# Patient Record
Sex: FEMALE | Race: NATIVE HAWAIIAN OR OTHER PACIFIC ISLANDER | NOT HISPANIC OR LATINO | Employment: OTHER | URBAN - METROPOLITAN AREA
[De-identification: names, ages, dates, MRNs, and addresses within clinical notes are randomized per-mention and may not be internally consistent; named-entity substitution may affect disease eponyms.]

---

## 2018-08-22 ENCOUNTER — APPOINTMENT (OUTPATIENT)
Dept: RADIOLOGY | Facility: MEDICAL CENTER | Age: 73
End: 2018-08-22
Attending: EMERGENCY MEDICINE
Payer: MEDICARE

## 2018-08-22 ENCOUNTER — HOSPITAL ENCOUNTER (OUTPATIENT)
Facility: MEDICAL CENTER | Age: 73
End: 2018-08-23
Attending: EMERGENCY MEDICINE | Admitting: INTERNAL MEDICINE
Payer: MEDICARE

## 2018-08-22 DIAGNOSIS — I10 HYPERTENSION, UNSPECIFIED TYPE: ICD-10-CM

## 2018-08-22 DIAGNOSIS — N39.0 ACUTE UTI: ICD-10-CM

## 2018-08-22 PROBLEM — R31.9 URINARY TRACT INFECTION WITH HEMATURIA: Status: ACTIVE | Noted: 2018-08-22

## 2018-08-22 PROBLEM — R79.89 ELEVATED BRAIN NATRIURETIC PEPTIDE (BNP) LEVEL: Status: ACTIVE | Noted: 2018-08-22

## 2018-08-22 PROBLEM — D64.9 NORMOCYTIC ANEMIA: Status: ACTIVE | Noted: 2018-08-22

## 2018-08-22 PROBLEM — E11.9 DMII (DIABETES MELLITUS, TYPE 2) (HCC): Status: ACTIVE | Noted: 2018-08-22

## 2018-08-22 PROBLEM — D72.829 LEUCOCYTOSIS: Status: ACTIVE | Noted: 2018-08-22

## 2018-08-22 LAB
ALBUMIN SERPL BCP-MCNC: 4.1 G/DL (ref 3.2–4.9)
ALBUMIN/GLOB SERPL: 1.6 G/DL
ALP SERPL-CCNC: 67 U/L (ref 30–99)
ALT SERPL-CCNC: 11 U/L (ref 2–50)
ANION GAP SERPL CALC-SCNC: 12 MMOL/L (ref 0–11.9)
ANISOCYTOSIS BLD QL SMEAR: ABNORMAL
APPEARANCE UR: ABNORMAL
AST SERPL-CCNC: 17 U/L (ref 12–45)
BACTERIA #/AREA URNS HPF: ABNORMAL /HPF
BASOPHILS # BLD AUTO: 6.1 % (ref 0–1.8)
BASOPHILS # BLD: 1.28 K/UL (ref 0–0.12)
BILIRUB SERPL-MCNC: 1.9 MG/DL (ref 0.1–1.5)
BILIRUB UR QL STRIP.AUTO: NEGATIVE
BNP SERPL-MCNC: 510 PG/ML (ref 0–100)
BUN SERPL-MCNC: 16 MG/DL (ref 8–22)
CALCIUM SERPL-MCNC: 9.2 MG/DL (ref 8.5–10.5)
CHLORIDE SERPL-SCNC: 106 MMOL/L (ref 96–112)
CO2 SERPL-SCNC: 22 MMOL/L (ref 20–33)
COLOR UR: YELLOW
CREAT SERPL-MCNC: 0.81 MG/DL (ref 0.5–1.4)
DACRYOCYTES BLD QL SMEAR: NORMAL
EKG IMPRESSION: NORMAL
EOSINOPHIL # BLD AUTO: 0.55 K/UL (ref 0–0.51)
EOSINOPHIL NFR BLD: 2.6 % (ref 0–6.9)
EPI CELLS #/AREA URNS HPF: ABNORMAL /HPF
ERYTHROCYTE [DISTWIDTH] IN BLOOD BY AUTOMATED COUNT: 57.1 FL (ref 35.9–50)
EST. AVERAGE GLUCOSE BLD GHB EST-MCNC: 146 MG/DL
FERRITIN SERPL-MCNC: 69.7 NG/ML (ref 10–291)
FOLATE SERPL-MCNC: 11.1 NG/ML
GLOBULIN SER CALC-MCNC: 2.6 G/DL (ref 1.9–3.5)
GLUCOSE SERPL-MCNC: 153 MG/DL (ref 65–99)
GLUCOSE UR STRIP.AUTO-MCNC: NEGATIVE MG/DL
HBA1C MFR BLD: 6.7 % (ref 0–5.6)
HCT VFR BLD AUTO: 33.9 % (ref 37–47)
HGB BLD-MCNC: 10.8 G/DL (ref 12–16)
HYALINE CASTS #/AREA URNS LPF: ABNORMAL /LPF
IRON SATN MFR SERPL: 5 % (ref 15–55)
IRON SERPL-MCNC: 18 UG/DL (ref 40–170)
KETONES UR STRIP.AUTO-MCNC: NEGATIVE MG/DL
LEUKOCYTE ESTERASE UR QL STRIP.AUTO: ABNORMAL
LG PLATELETS BLD QL SMEAR: NORMAL
LV EJECT FRACT  99904: 40
LV EJECT FRACT MOD 2C 99903: 42.2
LV EJECT FRACT MOD 4C 99902: 51.26
LV EJECT FRACT MOD BP 99901: 48.83
LYMPHOCYTES # BLD AUTO: 1.28 K/UL (ref 1–4.8)
LYMPHOCYTES NFR BLD: 6.1 % (ref 22–41)
MANUAL DIFF BLD: NORMAL
MCH RBC QN AUTO: 28.1 PG (ref 27–33)
MCHC RBC AUTO-ENTMCNC: 31.9 G/DL (ref 33.6–35)
MCV RBC AUTO: 88.1 FL (ref 81.4–97.8)
METAMYELOCYTES NFR BLD MANUAL: 2.6 %
MICRO URNS: ABNORMAL
MICROCYTES BLD QL SMEAR: ABNORMAL
MONOCYTES # BLD AUTO: 0.74 K/UL (ref 0–0.85)
MONOCYTES NFR BLD AUTO: 3.5 % (ref 0–13.4)
MORPHOLOGY BLD-IMP: NORMAL
MYELOCYTES NFR BLD MANUAL: 1.7 %
NEUTROPHILS # BLD AUTO: 16.25 K/UL (ref 2–7.15)
NEUTROPHILS NFR BLD: 74.8 % (ref 44–72)
NEUTS BAND NFR BLD MANUAL: 2.6 % (ref 0–10)
NITRITE UR QL STRIP.AUTO: POSITIVE
NRBC # BLD AUTO: 0.32 K/UL
NRBC BLD-RTO: 1.5 /100 WBC
OVALOCYTES BLD QL SMEAR: NORMAL
PH UR STRIP.AUTO: 5 [PH]
PLATELET # BLD AUTO: 449 K/UL (ref 164–446)
PLATELET BLD QL SMEAR: NORMAL
PMV BLD AUTO: 11.6 FL (ref 9–12.9)
POIKILOCYTOSIS BLD QL SMEAR: NORMAL
POLYCHROMASIA BLD QL SMEAR: NORMAL
POTASSIUM SERPL-SCNC: 4.2 MMOL/L (ref 3.6–5.5)
PROT SERPL-MCNC: 6.7 G/DL (ref 6–8.2)
PROT UR QL STRIP: 30 MG/DL
RBC # BLD AUTO: 3.85 M/UL (ref 4.2–5.4)
RBC # URNS HPF: ABNORMAL /HPF
RBC BLD AUTO: PRESENT
RBC UR QL AUTO: ABNORMAL
SCHISTOCYTES BLD QL SMEAR: NORMAL
SODIUM SERPL-SCNC: 140 MMOL/L (ref 135–145)
SP GR UR STRIP.AUTO: 1.02
TIBC SERPL-MCNC: 339 UG/DL (ref 250–450)
TROPONIN I SERPL-MCNC: 0.02 NG/ML (ref 0–0.04)
TSH SERPL DL<=0.005 MIU/L-ACNC: 1.38 UIU/ML (ref 0.38–5.33)
UROBILINOGEN UR STRIP.AUTO-MCNC: 0.2 MG/DL
VIT B12 SERPL-MCNC: 862 PG/ML (ref 211–911)
WBC # BLD AUTO: 21 K/UL (ref 4.8–10.8)
WBC #/AREA URNS HPF: ABNORMAL /HPF

## 2018-08-22 PROCEDURE — 83540 ASSAY OF IRON: CPT

## 2018-08-22 PROCEDURE — 99285 EMERGENCY DEPT VISIT HI MDM: CPT

## 2018-08-22 PROCEDURE — 85027 COMPLETE CBC AUTOMATED: CPT

## 2018-08-22 PROCEDURE — 700111 HCHG RX REV CODE 636 W/ 250 OVERRIDE (IP): Performed by: EMERGENCY MEDICINE

## 2018-08-22 PROCEDURE — 82607 VITAMIN B-12: CPT

## 2018-08-22 PROCEDURE — G0378 HOSPITAL OBSERVATION PER HR: HCPCS

## 2018-08-22 PROCEDURE — 82746 ASSAY OF FOLIC ACID SERUM: CPT

## 2018-08-22 PROCEDURE — 96372 THER/PROPH/DIAG INJ SC/IM: CPT | Mod: XU

## 2018-08-22 PROCEDURE — 93005 ELECTROCARDIOGRAM TRACING: CPT | Performed by: EMERGENCY MEDICINE

## 2018-08-22 PROCEDURE — A9270 NON-COVERED ITEM OR SERVICE: HCPCS | Performed by: INTERNAL MEDICINE

## 2018-08-22 PROCEDURE — 71045 X-RAY EXAM CHEST 1 VIEW: CPT

## 2018-08-22 PROCEDURE — 80053 COMPREHEN METABOLIC PANEL: CPT

## 2018-08-22 PROCEDURE — 93005 ELECTROCARDIOGRAM TRACING: CPT

## 2018-08-22 PROCEDURE — 81001 URINALYSIS AUTO W/SCOPE: CPT

## 2018-08-22 PROCEDURE — 83036 HEMOGLOBIN GLYCOSYLATED A1C: CPT

## 2018-08-22 PROCEDURE — 70450 CT HEAD/BRAIN W/O DYE: CPT

## 2018-08-22 PROCEDURE — 82728 ASSAY OF FERRITIN: CPT

## 2018-08-22 PROCEDURE — 96375 TX/PRO/DX INJ NEW DRUG ADDON: CPT | Mod: XU

## 2018-08-22 PROCEDURE — 87077 CULTURE AEROBIC IDENTIFY: CPT

## 2018-08-22 PROCEDURE — 87186 SC STD MICRODIL/AGAR DIL: CPT

## 2018-08-22 PROCEDURE — 85007 BL SMEAR W/DIFF WBC COUNT: CPT

## 2018-08-22 PROCEDURE — 84484 ASSAY OF TROPONIN QUANT: CPT

## 2018-08-22 PROCEDURE — 83550 IRON BINDING TEST: CPT

## 2018-08-22 PROCEDURE — 700102 HCHG RX REV CODE 250 W/ 637 OVERRIDE(OP): Performed by: INTERNAL MEDICINE

## 2018-08-22 PROCEDURE — 96365 THER/PROPH/DIAG IV INF INIT: CPT | Mod: XU

## 2018-08-22 PROCEDURE — 700105 HCHG RX REV CODE 258: Performed by: INTERNAL MEDICINE

## 2018-08-22 PROCEDURE — 93306 TTE W/DOPPLER COMPLETE: CPT | Mod: 26 | Performed by: INTERNAL MEDICINE

## 2018-08-22 PROCEDURE — 700111 HCHG RX REV CODE 636 W/ 250 OVERRIDE (IP): Performed by: INTERNAL MEDICINE

## 2018-08-22 PROCEDURE — 87086 URINE CULTURE/COLONY COUNT: CPT

## 2018-08-22 PROCEDURE — 700105 HCHG RX REV CODE 258: Performed by: EMERGENCY MEDICINE

## 2018-08-22 PROCEDURE — 99220 PR INITIAL OBSERVATION CARE,LEVL III: CPT | Performed by: INTERNAL MEDICINE

## 2018-08-22 PROCEDURE — 83880 ASSAY OF NATRIURETIC PEPTIDE: CPT

## 2018-08-22 PROCEDURE — 84443 ASSAY THYROID STIM HORMONE: CPT

## 2018-08-22 PROCEDURE — 93306 TTE W/DOPPLER COMPLETE: CPT

## 2018-08-22 RX ORDER — SODIUM CHLORIDE 9 MG/ML
INJECTION, SOLUTION INTRAVENOUS CONTINUOUS
Status: DISCONTINUED | OUTPATIENT
Start: 2018-08-22 | End: 2018-08-23

## 2018-08-22 RX ORDER — ATORVASTATIN CALCIUM 10 MG/1
10 TABLET, FILM COATED ORAL NIGHTLY
COMMUNITY

## 2018-08-22 RX ORDER — CARVEDILOL 12.5 MG/1
12.5 TABLET ORAL 2 TIMES DAILY WITH MEALS
COMMUNITY

## 2018-08-22 RX ORDER — ONDANSETRON 2 MG/ML
4 INJECTION INTRAMUSCULAR; INTRAVENOUS EVERY 4 HOURS PRN
Status: DISCONTINUED | OUTPATIENT
Start: 2018-08-22 | End: 2018-08-23 | Stop reason: HOSPADM

## 2018-08-22 RX ORDER — PRAVASTATIN SODIUM 20 MG
80 TABLET ORAL NIGHTLY
Status: DISCONTINUED | OUTPATIENT
Start: 2018-08-22 | End: 2018-08-22

## 2018-08-22 RX ORDER — ACETAMINOPHEN 325 MG/1
650 TABLET ORAL EVERY 6 HOURS PRN
Status: DISCONTINUED | OUTPATIENT
Start: 2018-08-22 | End: 2018-08-23 | Stop reason: HOSPADM

## 2018-08-22 RX ORDER — POLYETHYLENE GLYCOL 3350 17 G/17G
1 POWDER, FOR SOLUTION ORAL
Status: DISCONTINUED | OUTPATIENT
Start: 2018-08-22 | End: 2018-08-23 | Stop reason: HOSPADM

## 2018-08-22 RX ORDER — ONDANSETRON 4 MG/1
4 TABLET, ORALLY DISINTEGRATING ORAL EVERY 4 HOURS PRN
Status: DISCONTINUED | OUTPATIENT
Start: 2018-08-22 | End: 2018-08-23 | Stop reason: HOSPADM

## 2018-08-22 RX ORDER — AMLODIPINE BESYLATE 10 MG/1
10 TABLET ORAL
Status: DISCONTINUED | OUTPATIENT
Start: 2018-08-23 | End: 2018-08-23 | Stop reason: HOSPADM

## 2018-08-22 RX ORDER — AMOXICILLIN 250 MG
2 CAPSULE ORAL 2 TIMES DAILY
Status: DISCONTINUED | OUTPATIENT
Start: 2018-08-22 | End: 2018-08-23 | Stop reason: HOSPADM

## 2018-08-22 RX ORDER — ENALAPRILAT 1.25 MG/ML
1.25 INJECTION INTRAVENOUS EVERY 6 HOURS PRN
Status: DISCONTINUED | OUTPATIENT
Start: 2018-08-22 | End: 2018-08-23 | Stop reason: HOSPADM

## 2018-08-22 RX ORDER — ATORVASTATIN CALCIUM 10 MG/1
10 TABLET, FILM COATED ORAL EVERY EVENING
Status: DISCONTINUED | OUTPATIENT
Start: 2018-08-22 | End: 2018-08-23 | Stop reason: HOSPADM

## 2018-08-22 RX ORDER — OLMESARTAN MEDOXOMIL 20 MG/1
20 TABLET ORAL DAILY
Status: DISCONTINUED | OUTPATIENT
Start: 2018-08-22 | End: 2018-08-23 | Stop reason: HOSPADM

## 2018-08-22 RX ORDER — CARVEDILOL 6.25 MG/1
6.25 TABLET ORAL 2 TIMES DAILY WITH MEALS
Status: DISCONTINUED | OUTPATIENT
Start: 2018-08-22 | End: 2018-08-23 | Stop reason: HOSPADM

## 2018-08-22 RX ORDER — ALLOPURINOL 100 MG/1
100 TABLET ORAL EVERY MORNING
COMMUNITY

## 2018-08-22 RX ORDER — ATENOLOL 50 MG/1
50 TABLET ORAL DAILY
COMMUNITY
End: 2018-08-22

## 2018-08-22 RX ORDER — BISACODYL 10 MG
10 SUPPOSITORY, RECTAL RECTAL
Status: DISCONTINUED | OUTPATIENT
Start: 2018-08-22 | End: 2018-08-23 | Stop reason: HOSPADM

## 2018-08-22 RX ORDER — TRAZODONE HYDROCHLORIDE 50 MG/1
50 TABLET ORAL
Status: DISCONTINUED | OUTPATIENT
Start: 2018-08-22 | End: 2018-08-23 | Stop reason: HOSPADM

## 2018-08-22 RX ORDER — ATENOLOL 50 MG/1
50 TABLET ORAL DAILY
Status: DISCONTINUED | OUTPATIENT
Start: 2018-08-22 | End: 2018-08-22

## 2018-08-22 RX ADMIN — CEFTRIAXONE SODIUM 2 G: 2 INJECTION, POWDER, FOR SOLUTION INTRAMUSCULAR; INTRAVENOUS at 13:23

## 2018-08-22 RX ADMIN — ONDANSETRON 4 MG: 2 INJECTION, SOLUTION INTRAMUSCULAR; INTRAVENOUS at 19:53

## 2018-08-22 RX ADMIN — ATORVASTATIN CALCIUM 10 MG: 10 TABLET, FILM COATED ORAL at 18:15

## 2018-08-22 RX ADMIN — SODIUM CHLORIDE: 9 INJECTION, SOLUTION INTRAVENOUS at 16:09

## 2018-08-22 RX ADMIN — ENOXAPARIN SODIUM 40 MG: 40 INJECTION, SOLUTION INTRAVENOUS; SUBCUTANEOUS at 16:08

## 2018-08-22 RX ADMIN — CARVEDILOL 6.25 MG: 6.25 TABLET, FILM COATED ORAL at 18:16

## 2018-08-22 RX ADMIN — TRAZODONE HYDROCHLORIDE 50 MG: 50 TABLET ORAL at 20:40

## 2018-08-22 ASSESSMENT — ENCOUNTER SYMPTOMS
PALPITATIONS: 0
SPUTUM PRODUCTION: 0
NERVOUS/ANXIOUS: 0
BACK PAIN: 0
COUGH: 0
ORTHOPNEA: 0
WEIGHT LOSS: 0
DEPRESSION: 0
HEADACHES: 1
MYALGIAS: 0
DIAPHORESIS: 1
FEVER: 0
ABDOMINAL PAIN: 0
NAUSEA: 0
SEIZURES: 0
INSOMNIA: 0
EYE DISCHARGE: 0
EYE PAIN: 0
HEARTBURN: 0
NECK PAIN: 0
DIZZINESS: 0
BLURRED VISION: 0
FOCAL WEAKNESS: 0
SHORTNESS OF BREATH: 0
DIARRHEA: 0
CHILLS: 0
STRIDOR: 0
VOMITING: 0
EYE REDNESS: 0

## 2018-08-22 ASSESSMENT — LIFESTYLE VARIABLES
EVER_SMOKED: YES
ALCOHOL_USE: NO

## 2018-08-22 ASSESSMENT — PAIN SCALES - GENERAL
PAINLEVEL_OUTOF10: 0

## 2018-08-22 ASSESSMENT — COPD QUESTIONNAIRES
DO YOU EVER COUGH UP ANY MUCUS OR PHLEGM?: NO/ONLY WITH OCCASIONAL COLDS OR INFECTIONS
HAVE YOU SMOKED AT LEAST 100 CIGARETTES IN YOUR ENTIRE LIFE: YES
COPD SCREENING SCORE: 4
DURING THE PAST 4 WEEKS HOW MUCH DID YOU FEEL SHORT OF BREATH: NONE/LITTLE OF THE TIME
IN THE PAST 12 MONTHS DO YOU DO LESS THAN YOU USED TO BECAUSE OF YOUR BREATHING PROBLEMS: DISAGREE/UNSURE

## 2018-08-22 ASSESSMENT — PATIENT HEALTH QUESTIONNAIRE - PHQ9
SUM OF ALL RESPONSES TO PHQ9 QUESTIONS 1 AND 2: 0
2. FEELING DOWN, DEPRESSED, IRRITABLE, OR HOPELESS: NOT AT ALL
1. LITTLE INTEREST OR PLEASURE IN DOING THINGS: NOT AT ALL

## 2018-08-22 NOTE — ED PROVIDER NOTES
"ED Provider Note    CHIEF COMPLAINT  Chief Complaint   Patient presents with   • Hypertension     Pt reports \"my blood pressure has been all over the place.\"   • Headache     back of head, pt states \"I have a fuzzy feeling.\"   • Sweats     3-4 days       HPI  Tamanna Stockton is a 72 y.o. female here for evaluation of hypertension and headache.  The patient states that she has had a \"back of the head\" headache over the last 3 or 4 days, this is been intermittent, and is associated with her elevated blood pressure.  She does have a home monitoring system, and states that her blood pressure over the weekend was 200 systolically.  She denies any chest pain, shortness of breath, or weakness.  She has no abdominal pain or back pain.  She has no leg pain or paresthesias.  She states that over the course of the week and she has had 2 episodes of intermittent diaphoresis, accompanied with nausea.  She has no other medical concerns at this time.    PAST MEDICAL HISTORY   has a past medical history of Diabetes (HCC); Hypercholesteremia; and Hypertension.    SOCIAL HISTORY  Social History     Social History Main Topics   • Smoking status: Former Smoker   • Smokeless tobacco: Never Used   • Alcohol use No   • Drug use: No   • Sexual activity: Not on file       Family History  No bleeding disorders    SURGICAL HISTORY  patient denies any surgical history    CURRENT MEDICATIONS  Home Medications     Reviewed by Marybel Addison R.N. (Registered Nurse) on 08/22/18 at 0956  Med List Status: Partial   Medication Last Dose Status   atenolol (TENORMIN) 50 MG Tab  Active   olmesartan (BENICAR) 20 MG TABS not taking Active   pravastatin (PRAVACHOL) 80 MG tablet  Active                ALLERGIES  Allergies   Allergen Reactions   • Naproxen Hives and Rash     \"I break out\"       REVIEW OF SYSTEMS  See HPI for further details. Review of systems as above, otherwise all other systems are negative.     PHYSICAL EXAM  Constitutional: Well " developed, well nourished. No acute distress.  HEENT: Normocephalic, atraumatic. Posterior pharynx clear and moist.  Eyes:  EOMI. Normal sclera.  Neck: Supple, Full range of motion, nontender.  Chest/Pulmonary: clear to ausculation. Symmetrical expansion.   Cardio: Regular rate and rhythm with no murmur.   Abdomen: Soft, nontender. No peritoneal signs. No guarding. No palpable masses.  Back:  No cvat.  Non tender midline.   Musculoskeletal: No deformity, no edema, neurovascular intact.   Neuro: Clear speech, appropriate, cooperative, cranial nerves II-XII grossly intact.  Psych: Normal mood and affect    PROCEDURES     MEDICAL RECORD  I have reviewed patient's medical record and pertinent results are listed above.    COURSE & MEDICAL DECISION MAKING  I have reviewed any medical record information, laboratory studies and radiographic results as noted above.    Results for orders placed or performed during the hospital encounter of 08/22/18   CBC w/ Differential   Result Value Ref Range    WBC 21.0 (H) 4.8 - 10.8 K/uL    RBC 3.85 (L) 4.20 - 5.40 M/uL    Hemoglobin 10.8 (L) 12.0 - 16.0 g/dL    Hematocrit 33.9 (L) 37.0 - 47.0 %    MCV 88.1 81.4 - 97.8 fL    MCH 28.1 27.0 - 33.0 pg    MCHC 31.9 (L) 33.6 - 35.0 g/dL    RDW 57.1 (H) 35.9 - 50.0 fL    Platelet Count 449 (H) 164 - 446 K/uL    MPV 11.6 9.0 - 12.9 fL    Nucleated RBC 1.50 /100 WBC    NRBC (Absolute) 0.32 K/uL    Neutrophils-Polys 74.80 (H) 44.00 - 72.00 %    Lymphocytes 6.10 (L) 22.00 - 41.00 %    Monocytes 3.50 0.00 - 13.40 %    Eosinophils 2.60 0.00 - 6.90 %    Basophils 6.10 (H) 0.00 - 1.80 %    Neutrophils (Absolute) 16.25 (H) 2.00 - 7.15 K/uL    Lymphs (Absolute) 1.28 1.00 - 4.80 K/uL    Monos (Absolute) 0.74 0.00 - 0.85 K/uL    Eos (Absolute) 0.55 (H) 0.00 - 0.51 K/uL    Baso (Absolute) 1.28 (H) 0.00 - 0.12 K/uL    Anisocytosis 2+     Microcytosis 2+    Complete Metabolic Panel (CMP)   Result Value Ref Range    Sodium 140 135 - 145 mmol/L    Potassium  4.2 3.6 - 5.5 mmol/L    Chloride 106 96 - 112 mmol/L    Co2 22 20 - 33 mmol/L    Anion Gap 12.0 (H) 0.0 - 11.9    Glucose 153 (H) 65 - 99 mg/dL    Bun 16 8 - 22 mg/dL    Creatinine 0.81 0.50 - 1.40 mg/dL    Calcium 9.2 8.5 - 10.5 mg/dL    AST(SGOT) 17 12 - 45 U/L    ALT(SGPT) 11 2 - 50 U/L    Alkaline Phosphatase 67 30 - 99 U/L    Total Bilirubin 1.9 (H) 0.1 - 1.5 mg/dL    Albumin 4.1 3.2 - 4.9 g/dL    Total Protein 6.7 6.0 - 8.2 g/dL    Globulin 2.6 1.9 - 3.5 g/dL    A-G Ratio 1.6 g/dL   Btype Natriuretic Peptide   Result Value Ref Range    B Natriuretic Peptide 510 (H) 0 - 100 pg/mL   Troponin STAT   Result Value Ref Range    Troponin I 0.02 0.00 - 0.04 ng/mL   URINALYSIS,CULTURE IF INDICATED   Result Value Ref Range    Color Yellow     Character Cloudy (A)     Specific Gravity 1.019 <1.035    Ph 5.0 5.0 - 8.0    Glucose Negative Negative mg/dL    Ketones Negative Negative mg/dL    Protein 30 (A) Negative mg/dL    Bilirubin Negative Negative    Urobilinogen, Urine 0.2 Negative    Nitrite Positive (A) Negative    Leukocyte Esterase Large (A) Negative    Occult Blood Small (A) Negative    Micro Urine Req Microscopic    ESTIMATED GFR   Result Value Ref Range    GFR If African American >60 >60 mL/min/1.73 m 2    GFR If Non African American >60 >60 mL/min/1.73 m 2   DIFFERENTIAL MANUAL   Result Value Ref Range    Bands-Stabs 2.60 0.00 - 10.00 %    Metamyelocytes 2.60 %    Myelocytes 1.70 %    Manual Diff Status PERFORMED    PERIPHERAL SMEAR REVIEW   Result Value Ref Range    Peripheral Smear Review see below    PLATELET ESTIMATE   Result Value Ref Range    Plt Estimation Normal    MORPHOLOGY   Result Value Ref Range    RBC Morphology Present     Large Platelets 3+     Polychromia 1+     Poikilocytosis 2+     Ovalocytes 1+     Schistocytes 1+     Tear Drop Cells 1+    URINE MICROSCOPIC (W/UA)   Result Value Ref Range    -150 (A) /hpf    RBC 2-5 (A) /hpf    Bacteria Many (A) None /hpf    Epithelial Cells Few /hpf     Hyaline Cast 11-20 (A) /lpf   EKG (NOW)   Result Value Ref Range    Report       [facility]    Test Date:  2018  Pt Name:    GEE SORENSEN               Department: ER  MRN:        [id number]                      Room:  Gender:     Female                       Technician: [id number]  :        [id number]                   Requested By:ER TRIAGE PROTOCOL  Order #:    [id number]                    Reading MD:    Measurements  Intervals                                Axis  Rate:       84                           P:          57  IN:         188                          QRS:        24  QRSD:       88                           T:          112  QT:         416  QTc:        492    Interpretive Statements  SINUS RHYTHM  NONSPECIFIC T ABNORMALITIES, LATERAL LEADS  BORDERLINE PROLONGED QT INTERVAL  No previous ECG available for comparison       CT-HEAD W/O   Final Result         1. No acute intracranial abnormality. No evidence of acute intracranial hemorrhage or mass lesion.               DX-CHEST-PORTABLE (1 VIEW)   Final Result         1. No acute cardiopulmonary abnormalities are identified.        The pt will be admitted to the CDU under Dr. Iniguez.  The patient currently has a blood pressure of 152/76, and does not have a headache.  We will do antibiotics down here in the emergency department, and she will be admitted.  She is afebrile and nontoxic-appearing, however because of her white count of 20,000 and her urine, she will need IV antibiotics and placement.    Differential diagnoses include but not limited to: hypertensive headache,  Mi, pneumonia, uti    FINAL IMPRESSION  1. Acute UTI    2. Hypertension, unspecified type    Electronically signed by: Wesly Quijano, 2018 11:07 AM

## 2018-08-22 NOTE — H&P
" Hospital Medicine History and Physical      Date of Service  8/22/2018    Chief Complaint  Chief Complaint   Patient presents with   • Hypertension     Pt reports \"my blood pressure has been all over the place.\"   • Headache     back of head, pt states \"I have a fuzzy feeling.\"   • Sweats     3-4 days       History of Presenting Illness  Mahin is a 72 y.o. female PMH of HTN, DM, DLD, who presents with above. She said since Sunday she has been having very high blood pressure with systolic blood pressure up to 230.  She was also complaining about headache associated with it.  And she has not been able to sleep well.  The patient denied any neurological deficit.  In the ER she was found to have a urinary tract infection.  She will be admitted for observation.    Primary Care Physician  Pcp Pt States None      Code Status  Full code    Review of Systems  Review of Systems   Constitutional: Positive for diaphoresis. Negative for chills, fever and weight loss.   HENT: Negative for congestion and nosebleeds.    Eyes: Negative for blurred vision, pain, discharge and redness.   Respiratory: Negative for cough, sputum production, shortness of breath and stridor.    Cardiovascular: Negative for chest pain, palpitations and orthopnea.   Gastrointestinal: Negative for abdominal pain, diarrhea, heartburn, nausea and vomiting.   Genitourinary: Negative for dysuria, frequency and urgency.   Musculoskeletal: Negative for back pain, myalgias and neck pain.   Skin: Negative for itching and rash.   Neurological: Positive for headaches. Negative for dizziness, focal weakness and seizures.   Psychiatric/Behavioral: Negative for depression. The patient is not nervous/anxious and does not have insomnia.      Please see HPI, all other systems were reviewed and are negative (AMA/CMS criteria)     Past Medical History  Past Medical History:   Diagnosis Date   • Diabetes (HCC)    • Hypercholesteremia    • Hypertension        Surgical " "History  No past surgical history on file.    Medications  No current facility-administered medications on file prior to encounter.      Current Outpatient Prescriptions on File Prior to Encounter   Medication Sig Dispense Refill   • olmesartan (BENICAR) 20 MG TABS Take 20 mg by mouth every day.     • pravastatin (PRAVACHOL) 80 MG tablet Take 80 mg by mouth every evening.       Family History  History reviewed. No pertinent family history.      Social History  Social History   Substance Use Topics   • Smoking status: Former Smoker   • Smokeless tobacco: Never Used   • Alcohol use No       Allergies  Allergies   Allergen Reactions   • Naproxen Hives and Rash     \"I break out\"        Physical Exam  Laboratory   Hemodynamics  Temp (24hrs), Av.1 °C (97 °F), Min:36.1 °C (97 °F), Max:36.1 °C (97 °F)   Temperature: 36.1 °C (97 °F)  Pulse  Av.5  Min: 71  Max: 76 Heart Rate (Monitored): 68  Blood Pressure : (!) 168/83, NIBP: 152/76      Respiratory      Respiration: 16, Pulse Oximetry: 88 %             Physical Exam   Constitutional: She is oriented to person, place, and time. No distress.   HENT:   Head: Normocephalic and atraumatic.   Mouth/Throat: Oropharynx is clear and moist.   Eyes: Pupils are equal, round, and reactive to light. Conjunctivae and EOM are normal.   Neck: Normal range of motion. Neck supple. No tracheal deviation present. No thyromegaly present.   Cardiovascular: Normal rate and regular rhythm.    No murmur heard.  Pulmonary/Chest: Effort normal and breath sounds normal. No respiratory distress. She has no wheezes.   Abdominal: Soft. Bowel sounds are normal. She exhibits no distension. There is no tenderness.   Musculoskeletal: She exhibits no edema or tenderness.   Neurological: She is alert and oriented to person, place, and time. No cranial nerve deficit.   Skin: Skin is warm and dry. She is not diaphoretic. No erythema.   Psychiatric: She has a normal mood and affect. Her behavior is normal. " Thought content normal.       Recent Labs      08/22/18   1036   WBC  21.0*   RBC  3.85*   HEMOGLOBIN  10.8*   HEMATOCRIT  33.9*   MCV  88.1   MCH  28.1   MCHC  31.9*   RDW  57.1*   PLATELETCT  449*   MPV  11.6     Recent Labs      08/22/18   1036   SODIUM  140   POTASSIUM  4.2   CHLORIDE  106   CO2  22   GLUCOSE  153*   BUN  16   CREATININE  0.81   CALCIUM  9.2     Recent Labs      08/22/18   1036   ALTSGPT  11   ASTSGOT  17   ALKPHOSPHAT  67   TBILIRUBIN  1.9*   GLUCOSE  153*         Recent Labs      08/22/18   1036   BNPBTYPENAT  510*         Lab Results   Component Value Date    TROPONINI 0.02 08/22/2018       Imaging  CT-HEAD W/O   Final Result         1. No acute intracranial abnormality. No evidence of acute intracranial hemorrhage or mass lesion.               DX-CHEST-PORTABLE (1 VIEW)   Final Result         1. No acute cardiopulmonary abnormalities are identified.             Assessment/Plan     I anticipate this patient is appropriate for observation status at this time.    Elevated brain natriuretic peptide (BNP) level- (present on admission)   Assessment & Plan    Check echo        Normocytic anemia- (present on admission)   Assessment & Plan    Check b12, folate, tsh, iron panel  Follow cbc        Leucocytosis- (present on admission)   Assessment & Plan    Related to UTI        Urinary tract infection with hematuria- (present on admission)   Assessment & Plan    On IV ceftriaxone  Hold abx for now          DMII (diabetes mellitus, type 2) (HCC)- (present on admission)   Assessment & Plan    On insulin management  Check a1c        Essential hypertension- (present on admission)   Assessment & Plan    BP has been high  On amlodipine, olmesartan  Adjust meds as needed            Prophylaxis:  sc heparin

## 2018-08-22 NOTE — DISCHARGE PLANNING
Care Transition Team Assessment    Met with pt and maximino Kim, at bedside. According to pt she is independent at baseline, does not use assistive devices. Currently visiting nimarcial Lund in [place]. Independent at baseline, does not use assistive devices. Judy confirmed transport at discharge.      Information Source  Orientation : Oriented x 4  Information Given By: Patient  Informant's Name: Tamanna Stockton    Readmission Evaluation  Is this a readmission?: No    Interdisciplinary Discharge Planning  Does Admitting Nurse Feel This Could be a Complex Discharge?: No  Primary Care Physician: Dr. Freitas  [phone]  Lives with - Patient's Self Care Capacity: Alone and Unable to Care For Self  Patient or legal guardian wants to designate a caregiver (see row info): Yes  Caregiver name: Lexii Chang  Caregiver relationship to patient: Maximino  Caregiver contact info: [phone]  Support Systems: Family Member(s), Friends / Neighbors  Housing / Facility: Mobile Home  Do You Take your Prescribed Medications Regularly: Yes  Able to Return to Previous ADL's: Yes  Mobility Issues: No  Prior Services: None  Assistance Needed: No  Durable Medical Equipment: Not Applicable    Discharge Preparedness  What are your discharge supports?: Other (comment) (Niece)  Prior Functional Level: Ambulatory, Independent with Activities of Daily Living, Independent with Medication Management  Difficulity with ADLs: None  Difficulity with IADLs: None    Functional Assesment  Prior Functional Level: Ambulatory, Independent with Activities of Daily Living, Independent with Medication Management    Finances  Prescription Coverage: Yes    Discharge Risks or Barriers  Discharge risks or barriers?: No    Anticipated Discharge Information  Anticipated discharge disposition: Home (Maximino's home in [place])  Discharge Address: Pt lives [address] [place] [address]  Discharge Contact Phone Number: Patient [phone]

## 2018-08-22 NOTE — PROGRESS NOTES
Report received, pt care assumed. Pt arrive to floor. Pt ambulate to bed with steady gait. Pt ordered a late lunch tray. Admit profile complete. Pt up to date on flu and PNA vaccine. Call light and personal belongings within reach. Will continue to monitor.

## 2018-08-22 NOTE — ED TRIAGE NOTES
"Tamanna Stockton  Chief Complaint   Patient presents with   • Hypertension     Pt reports \"my blood pressure has been all over the place.\"   • Headache     back of head, pt states \"I have a fuzzy feeling.\"   • Sweats     3-4 days     Pt ambulatory to triage with above complaint. EKG completed in triage. Pt denies SOB or CP.     BP (!) 168/83   Pulse 76   Temp 36.1 °C (97 °F)   Resp 14   Ht 1.549 m (5' 1\")   Wt 83.7 kg (184 lb 8.4 oz)   SpO2 94%   BMI 34.87 kg/m²     Pt informed of triage process and encouraged to notify staff of any changes or concerns. Pt verbalized understanding of instructions. Pt placed back in lobby.     "

## 2018-08-23 ENCOUNTER — PATIENT OUTREACH (OUTPATIENT)
Dept: HEALTH INFORMATION MANAGEMENT | Facility: OTHER | Age: 73
End: 2018-08-23

## 2018-08-23 VITALS
BODY MASS INDEX: 34.84 KG/M2 | TEMPERATURE: 97.2 F | WEIGHT: 184.53 LBS | HEIGHT: 61 IN | RESPIRATION RATE: 15 BRPM | SYSTOLIC BLOOD PRESSURE: 132 MMHG | HEART RATE: 74 BPM | DIASTOLIC BLOOD PRESSURE: 64 MMHG | OXYGEN SATURATION: 95 %

## 2018-08-23 PROBLEM — I11.0 CONGESTIVE HEART FAILURE DUE TO HYPERTENSION (HCC): Status: ACTIVE | Noted: 2018-08-23

## 2018-08-23 LAB
ANION GAP SERPL CALC-SCNC: 7 MMOL/L (ref 0–11.9)
BUN SERPL-MCNC: 16 MG/DL (ref 8–22)
CALCIUM SERPL-MCNC: 8.3 MG/DL (ref 8.5–10.5)
CHLORIDE SERPL-SCNC: 108 MMOL/L (ref 96–112)
CO2 SERPL-SCNC: 24 MMOL/L (ref 20–33)
CREAT SERPL-MCNC: 0.81 MG/DL (ref 0.5–1.4)
ERYTHROCYTE [DISTWIDTH] IN BLOOD BY AUTOMATED COUNT: 58.1 FL (ref 35.9–50)
GLUCOSE SERPL-MCNC: 136 MG/DL (ref 65–99)
HCT VFR BLD AUTO: 29.6 % (ref 37–47)
HGB BLD-MCNC: 9.5 G/DL (ref 12–16)
MCH RBC QN AUTO: 28.6 PG (ref 27–33)
MCHC RBC AUTO-ENTMCNC: 32.1 G/DL (ref 33.6–35)
MCV RBC AUTO: 89.2 FL (ref 81.4–97.8)
PLATELET # BLD AUTO: 406 K/UL (ref 164–446)
PMV BLD AUTO: 12.2 FL (ref 9–12.9)
POTASSIUM SERPL-SCNC: 3.7 MMOL/L (ref 3.6–5.5)
RBC # BLD AUTO: 3.32 M/UL (ref 4.2–5.4)
SODIUM SERPL-SCNC: 139 MMOL/L (ref 135–145)
WBC # BLD AUTO: 16.7 K/UL (ref 4.8–10.8)

## 2018-08-23 PROCEDURE — 700102 HCHG RX REV CODE 250 W/ 637 OVERRIDE(OP): Performed by: INTERNAL MEDICINE

## 2018-08-23 PROCEDURE — A9270 NON-COVERED ITEM OR SERVICE: HCPCS | Performed by: INTERNAL MEDICINE

## 2018-08-23 PROCEDURE — 700105 HCHG RX REV CODE 258: Performed by: INTERNAL MEDICINE

## 2018-08-23 PROCEDURE — 700111 HCHG RX REV CODE 636 W/ 250 OVERRIDE (IP): Performed by: INTERNAL MEDICINE

## 2018-08-23 PROCEDURE — 99217 PR OBSERVATION CARE DISCHARGE: CPT | Performed by: INTERNAL MEDICINE

## 2018-08-23 PROCEDURE — 96372 THER/PROPH/DIAG INJ SC/IM: CPT

## 2018-08-23 PROCEDURE — 96366 THER/PROPH/DIAG IV INF ADDON: CPT

## 2018-08-23 PROCEDURE — G0378 HOSPITAL OBSERVATION PER HR: HCPCS

## 2018-08-23 PROCEDURE — 85027 COMPLETE CBC AUTOMATED: CPT

## 2018-08-23 PROCEDURE — 80048 BASIC METABOLIC PNL TOTAL CA: CPT

## 2018-08-23 RX ORDER — SPIRONOLACTONE 25 MG/1
12.5 TABLET ORAL DAILY
Qty: 30 TAB | Refills: 3 | Status: SHIPPED | OUTPATIENT
Start: 2018-08-23

## 2018-08-23 RX ORDER — OLMESARTAN MEDOXOMIL 20 MG/1
20 TABLET ORAL DAILY
Qty: 30 TAB | Refills: 0 | Status: SHIPPED | OUTPATIENT
Start: 2018-08-24

## 2018-08-23 RX ORDER — CEFUROXIME AXETIL 250 MG/1
250 TABLET ORAL 2 TIMES DAILY
Qty: 4 TAB | Refills: 0 | Status: SHIPPED | OUTPATIENT
Start: 2018-08-23 | End: 2018-08-25

## 2018-08-23 RX ORDER — OLMESARTAN MEDOXOMIL 20 MG/1
20 TABLET ORAL DAILY
Qty: 30 TAB | Refills: 0 | Status: SHIPPED | OUTPATIENT
Start: 2018-08-24 | End: 2018-08-23

## 2018-08-23 RX ADMIN — ENOXAPARIN SODIUM 40 MG: 40 INJECTION, SOLUTION INTRAVENOUS; SUBCUTANEOUS at 06:00

## 2018-08-23 RX ADMIN — OLMESARTAN MEDOXOMIL 20 MG: 20 TABLET, COATED ORAL at 06:00

## 2018-08-23 RX ADMIN — CEFTRIAXONE 2 G: 2 INJECTION, POWDER, FOR SOLUTION INTRAMUSCULAR; INTRAVENOUS at 06:00

## 2018-08-23 RX ADMIN — SODIUM CHLORIDE: 9 INJECTION, SOLUTION INTRAVENOUS at 01:57

## 2018-08-23 RX ADMIN — CARVEDILOL 6.25 MG: 6.25 TABLET, FILM COATED ORAL at 06:00

## 2018-08-23 RX ADMIN — AMLODIPINE BESYLATE 10 MG: 10 TABLET ORAL at 06:00

## 2018-08-23 ASSESSMENT — LIFESTYLE VARIABLES: DO YOU DRINK ALCOHOL: NO

## 2018-08-23 ASSESSMENT — PAIN SCALES - GENERAL: PAINLEVEL_OUTOF10: 0

## 2018-08-23 NOTE — CONSULTS
Reason of Consult: Abnormal echocardiogram    Consulting Physician: Dr. Iniguez    HPI:  72-year-old female with a history of hypertension diabetes mellitus and dyslipidemia who presented to the hospital with hypertensive emergency and a blood pressure of 230 systolic associated with a headache.  She was also noted to have urinary tract infection.  She has been initiated on treatment for these things.  She had an elevated BNP of 500 and therefore an echo was checked which showed a mildly reduced ejection fraction of 40-45% and no other abnormalities.  She has no heart failure symptoms antecedent to this event and no subsequent symptoms since her blood pressure has become controlled.  She has a history of smoking but quit in the 90s and quit drinking alcohol 4 years ago but did not drink heavily prior to that.  No drug use.  She travels constantly and is planning on leaving for [place] on Saturday.  She is not from the area.    Past Medical History:   Diagnosis Date   • Congestive heart failure due to hypertension (HCC) 8/23/2018   • Diabetes (HCC)    • Hypercholesteremia    • Hypertension        Social History     Social History   • Marital status:      Spouse name: N/A   • Number of children: N/A   • Years of education: N/A     Occupational History   • Not on file.     Social History Main Topics   • Smoking status: Former Smoker   • Smokeless tobacco: Never Used   • Alcohol use No   • Drug use: No   • Sexual activity: Not on file     Other Topics Concern   • Not on file     Social History Narrative   • No narrative on file       No current facility-administered medications on file prior to encounter.      No current outpatient prescriptions on file prior to encounter.       Current Facility-Administered Medications   Medication Dose Frequency Provider Last Rate Last Dose   • olmesartan (BENICAR) tablet 20 mg  20 mg DAILY Raj Iinguez M.D.   20 mg at 08/23/18 0600   • senna-docusate (PERICOLACE or SENOKOT S) 8.6-50  "MG per tablet 2 Tab  2 Tab BID Raj Iniguez M.D.        And   • polyethylene glycol/lytes (MIRALAX) PACKET 1 Packet  1 Packet QDAY PRN Raj Iniguez M.D.        And   • magnesium hydroxide (MILK OF MAGNESIA) suspension 30 mL  30 mL QDAY PRN Raj Iniguez M.D.        And   • bisacodyl (DULCOLAX) suppository 10 mg  10 mg QDAY PRN Raj Iniguez M.D.       • enoxaparin (LOVENOX) inj 40 mg  40 mg DAILY Raj Iniguez M.D.   40 mg at 08/23/18 0600   • ondansetron (ZOFRAN) syringe/vial injection 4 mg  4 mg Q4HRS PRN Raj Iniguez M.D.   4 mg at 08/22/18 1953   • ondansetron (ZOFRAN ODT) dispertab 4 mg  4 mg Q4HRS PRN Raj Iniguez M.D.       • acetaminophen (TYLENOL) tablet 650 mg  650 mg Q6HRS PRN Raj Iniguez M.D.       • cefTRIAXone (ROCEPHIN) 2 g in  mL IVPB  2 g Q24HRS Raj Iniguez M.D.   Stopped at 08/23/18 0630   • amLODIPine (NORVASC) tablet 10 mg  10 mg Q DAY Raj Iniguez M.D.   10 mg at 08/23/18 0600   • enalaprilat (VASOTEC) injection 1.25 mg  1.25 mg Q6HRS PRN Raj Iniguez M.D.       • traZODone (DESYREL) tablet 50 mg  50 mg QHS Raj Iniguez M.D.   50 mg at 08/22/18 2040   • atorvastatin (LIPITOR) tablet 10 mg  10 mg Q EVENING Raj Iniguez M.D.   10 mg at 08/22/18 1815   • carvedilol (COREG) tablet 6.25 mg  6.25 mg BID WITH MEALS Raj Iniguez M.D.   6.25 mg at 08/23/18 0600     Last reviewed on 8/22/2018  1:25 PM by Radha Sanders    Naproxen    History reviewed. No pertinent family history.    ROS: As per HPI all other systems reviewed and negative     Physical Exam   Blood pressure (P) 132/64, pulse (P) 74, temperature (P) 36.2 °C (97.2 °F), resp. rate (P) 15, height 1.549 m (5' 1\"), weight 83.7 kg (184 lb 8.4 oz), SpO2 (P) 95 %, not currently breastfeeding.    Constitutional: Appears well-developed.   HENT: Normocephalic and atraumatic. No scleral icterus.   Neck: No JVD present.   Cardiovascular: Normal rate. Exam reveals no gallop and no friction rub. No murmur heard.   Pulmonary/Chest: CTAB    Abdominal: S/NT/ND " BS+   Musculoskeletal:  Pulses present. No atrophy. Strength normal.  Extremities: Exhibits no edema. No clubbing or cyanosis.   Skin: Skin is warm and dry.   Neuro: Non-focal, CN 2-12 intact grossly    No intake or output data in the 24 hours ending 08/23/18 1131    Recent Labs      08/22/18   1036  08/23/18   0610   WBC  21.0*  16.7*   RBC  3.85*  3.32*   HEMOGLOBIN  10.8*  9.5*   HEMATOCRIT  33.9*  29.6*   MCV  88.1  89.2   MCH  28.1  28.6   MCHC  31.9*  32.1*   RDW  57.1*  58.1*   PLATELETCT  449*  406   MPV  11.6  12.2     Recent Labs      08/22/18   1036  08/23/18   0610   SODIUM  140  139   POTASSIUM  4.2  3.7   CHLORIDE  106  108   CO2  22  24   GLUCOSE  153*  136*   BUN  16  16   CREATININE  0.81  0.81   CALCIUM  9.2  8.3*         Recent Labs      08/22/18   1036   BNPBTYPENAT  510*     Recent Labs      08/22/18   1036   TROPONINI  0.02   BNPBTYPENAT  510*           EKG (8/22/2018):  I have personally reviewed the EKG this visit and discussed with the patient. It shows sinus rhythm with nonspecific ST-T wave abnormalities laterally.    ECHO CONCLUSIONS (8/22/2018):  No prior study is available for comparison.   Left ventricle is mildly dilated.  Mildly reduced left ventricular systolic function.  Left ventricular ejection fraction is visually estimated to be 40-45%.  Grade II diastolic dysfunction.  Mildly dilated left atrium.  Mild to moderate mitral regurgitation.  Aortic sclerosis without stenosis.  Mild to moderate tricuspid regurgitation.  Estimated right ventricular systolic pressure is 45 mmHg.    Imaging reviewed    Impressions:  1.  Hypertensive emergency  2.  Cardiomyopathy, mildly reduced LVEF 45%  3.  Mild to moderate mitral regurgitation  4.  Diabetes mellitus  5.  Hyperlipidemia  6.  Urinary tract infection    Recommendations:  There is no evidence of active heart failure exacerbation.  The elevated BNP is likely a sequela of the hypertensive emergency.  The cardiomyopathy may be transient or  pre-existing and the etiology of which is likely hypertensive heart disease however ischemic evaluation should be undertaken in the future given her significant risk factors.  I agree with the initiation of medical therapy for her cardiomyopathy without active heart failure, and an outpatient evaluation for ischemia.    1.  Increase carvedilol to her outpatient dosing of 12.5 mg twice daily  2.  Continue antihypertensive therapy and ARB treatment  3.  Aspirin 81 mg daily  4.  Outpatient ischemic evaluation  5.  Spironolactone 12.5mg daily    She is not interested in following up with our cardiology clinic as she is not from the area.  She was offered an appointment within 5 days and she is unwilling to stay for this.  I do recommend close cardiac follow-up and primary care follow-up for her newly initiated medications.    Thank you for the consultation. Will sign off. Please call with any questions.    Jg Padilla MD, FACC, [facility]  Division of Interventional Cardiology  [facility]      Discussed with the patient and bedside nursing.

## 2018-08-23 NOTE — PROGRESS NOTES
Pt's nephew arrived to pick her up.  Pt taken via w/c to [facility] to meet nephew.  All belongings in possession at discharge.

## 2018-08-23 NOTE — DISCHARGE SUMMARY
"Discharge Summary    CHIEF COMPLAINT ON ADMISSION  Chief Complaint   Patient presents with   • Hypertension     Pt reports \"my blood pressure has been all over the place.\"   • Headache     back of head, pt states \"I have a fuzzy feeling.\"   • Sweats     3-4 days       Reason for Admission  Chest Pain     Admission Date  8/22/2018    CODE STATUS  Full Code    HPI & HOSPITAL COURSE  This is a 72 y.o. Female with a past medical history of hypertension, diabetes, and dyslipidemia here with worsening headache and elevated blood pressures.  On admission, the patient was found to have systolic blood pressure of 230.  She was started on oral antihypertensives with improvement of her blood pressure and her headache.  Further workup with echocardiogram revealed a reduced LVEF of 40-45%, which is suspected to be transient cardiomyopathy from her uncontrolled blood pressure as she has no history of CHF and no signs of acute exacerbation.  Ischemic cardiomyopathy cannot be ruled out at this time without further workup which is recommended as an outpatient as the patient is acutely stable.  She will continue her home dose of Coreg.  She was started on olmesartan and spironolactone.  Dr. Padilla with [facility] assessed the patient and recommended follow up in the outpatient clinic, but the patient refused as she is from out of the area.  She is highly recommended to have follow up with her PCP and have a referral to cardiology on return home for further workup and recommendations.  The patient was also found have a UTI and will complete an oral antibiotic origin.  At this time the patient has no further need for acute intervention.       Therefore, she is discharged in good and stable condition to home with close outpatient follow-up.    Discharge Date  08/23/2018    FOLLOW UP ITEMS POST DISCHARGE  The patient needs to follow with her PCP at her earliest convenience to establish an outpatient cardiologist for further " "workup and management of her newly diagnosed cardiomyopathy.    DISCHARGE DIAGNOSES  Active Problems:    Essential hypertension POA: Yes    DMII (diabetes mellitus, type 2) (HCC) POA: Yes    Urinary tract infection with hematuria POA: Yes    Leucocytosis POA: Yes    Normocytic anemia POA: Yes    Elevated brain natriuretic peptide (BNP) level POA: Yes    Congestive heart failure due to hypertension (HCC) POA: Clinically Undetermined  Resolved Problems:    * No resolved hospital problems. *      FOLLOW UP    Dr. Freitas  [phone] ([place])    Please follow up with your pcp to obtain a referral to cardiology. Thank you      MEDICATIONS ON DISCHARGE     Medication List      START taking these medications      Instructions   spironolactone 25 MG Tabs  Commonly known as:  ALDACTONE   Take 0.5 Tabs by mouth every day.  Dose:  12.5 mg        CONTINUE taking these medications      Instructions   allopurinol 100 MG Tabs  Commonly known as:  ZYLOPRIM   Take 100 mg by mouth every morning.  Dose:  100 mg     aspirin 81 MG tablet   Take 81 mg by mouth every bedtime.  Dose:  81 mg     atorvastatin 10 MG Tabs  Commonly known as:  LIPITOR   Take 10 mg by mouth every evening.  Dose:  10 mg     COREG 12.5 MG Tabs  Generic drug:  carvedilol   Take 12.5 mg by mouth 2 times a day, with meals.  Dose:  12.5 mg     metformin 1000 MG tablet  Commonly known as:  GLUCOPHAGE   Take 500 mg by mouth 2 times a day, with meals.  Dose:  500 mg     olmesartan 20 MG Tabs  Commonly known as:  BENICAR   Take 1 Tab by mouth every day.  Dose:  20 mg            Allergies  Allergies   Allergen Reactions   • Naproxen Hives and Rash     \"I break out\"       DIET  Orders Placed This Encounter   Procedures   • Diet Order Regular     Standing Status:   Standing     Number of Occurrences:   1     Order Specific Question:   Diet:     Answer:   Regular [1]       ACTIVITY  As tolerated.    CONSULTATIONS  Cardiology - Dr. Padilla      LABORATORY  Lab Results "   Component Value Date    SODIUM 139 08/23/2018    POTASSIUM 3.7 08/23/2018    CHLORIDE 108 08/23/2018    CO2 24 08/23/2018    GLUCOSE 136 (H) 08/23/2018    BUN 16 08/23/2018    CREATININE 0.81 08/23/2018        Lab Results   Component Value Date    WBC 16.7 (H) 08/23/2018    HEMOGLOBIN 9.5 (L) 08/23/2018    HEMATOCRIT 29.6 (L) 08/23/2018    PLATELETCT 406 08/23/2018        Total time of the discharge process was 37 minutes.

## 2018-08-23 NOTE — PROGRESS NOTES
Pt given d/c instructions, f/u info, paper RX x 1 and location and administration instructions for RX x 2 with verbal understanding.  PIV d/c'd with tip intact.  Pt dressed independently.  Pt calling her nephew for a ride home at this time.

## 2018-08-23 NOTE — DISCHARGE INSTRUCTIONS
Urinary Tract Infection, Adult  Introduction  A urinary tract infection (UTI) is an infection of any part of the urinary tract. The urinary tract includes the:  · Kidneys.  · Ureters.  · Bladder.  · Urethra.  These organs make, store, and get rid of pee (urine) in the body.  Follow these instructions at home:  · Take over-the-counter and prescription medicines only as told by your doctor.  · If you were prescribed an antibiotic medicine, take it as told by your doctor. Do not stop taking the antibiotic even if you start to feel better.  · Avoid the following drinks:  ¨ Alcohol.  ¨ Caffeine.  ¨ Tea.  ¨ Carbonated drinks.  · Drink enough fluid to keep your pee clear or pale yellow.  · Keep all follow-up visits as told by your doctor. This is important.  · Make sure to:  ¨ Empty your bladder often and completely. Do not to hold pee for long periods of time.  ¨ Empty your bladder before and after sex.  ¨ Wipe from front to back after a bowel movement if you are female. Use each tissue one time when you wipe.  Contact a doctor if:  · You have back pain.  · You have a fever.  · You feel sick to your stomach (nauseous).  · You throw up (vomit).  · Your symptoms do not get better after 3 days.  · Your symptoms go away and then come back.  Get help right away if:  · You have very bad back pain.  · You have very bad lower belly (abdominal) pain.  · You are throwing up and cannot keep down any medicines or water.  This information is not intended to replace advice given to you by your health care provider. Make sure you discuss any questions you have with your health care provider.  Document Released: 06/05/2009 Document Revised: 05/25/2017 Document Reviewed: 11/07/2016  © 2017 [employer]      Discharge Instructions    Discharged to home by car with relative. Discharged via wheelchair, hospital escort: Yes.  Special equipment needed: Not Applicable    Be sure to schedule a follow-up appointment with your primary care doctor or  any specialists as instructed.     Discharge Plan:   Diet Plan: Discussed  Activity Level: Discussed  Confirmed Follow up Appointment: Patient to Call and Schedule Appointment  Confirmed Symptoms Management: Discussed  Medication Reconciliation Updated: Yes  Influenza Vaccine Indication: Not indicated: Previously immunized this influenza season and > 8 years of age    I understand that a diet low in cholesterol, fat, and sodium is recommended for good health. Unless I have been given specific instructions below for another diet, I accept this instruction as my diet prescription.   Other diet: heart healthy.    Special Instructions: None    · Is patient discharged on Warfarin / Coumadin?   No     Depression / Suicide Risk    As you are discharged from this [facility] facility, it is important to learn how to keep safe from harming yourself.    Recognize the warning signs:  · Abrupt changes in personality, positive or negative- including increase in energy   · Giving away possessions  · Change in eating patterns- significant weight changes-  positive or negative  · Change in sleeping patterns- unable to sleep or sleeping all the time   · Unwillingness or inability to communicate  · Depression  · Unusual sadness, discouragement and loneliness  · Talk of wanting to die  · Neglect of personal appearance   · Rebelliousness- reckless behavior  · Withdrawal from people/activities they love  · Confusion- inability to concentrate     If you or a loved one observes any of these behaviors or has concerns about self-harm, here's what you can do:  · Talk about it- your feelings and reasons for harming yourself  · Remove any means that you might use to hurt yourself (examples: pills, rope, extension cords, firearm)  · Get professional help from the community (Mental Health, Substance Abuse, psychological counseling)  · Do not be alone:Call your Safe Contact- someone whom you trust who will be there for you.  · Call your local  CRISIS HOTLINE 393-5003 or 208-342-8833  · Call your local Children's Mobile Crisis Response Team [place] (904) 043-6306 or www.Arrowsight  · Call the toll free National Suicide Prevention Hotlines   · National Suicide Prevention Lifeline 784-100-XYOW (8799)  · National [employer] Line Network 800-SUICIDE (687-2158)

## 2018-08-23 NOTE — PROGRESS NOTES
Report received, pt up to BR with standby assist. Steady gait, medicated for nausea by assist RN. Call light within reach.

## 2018-08-23 NOTE — PROGRESS NOTES
Pt ready for sleep. Denying any pain. BP stable. Aware of plan of care for overnight, call light within reach.

## 2018-08-24 ENCOUNTER — PATIENT OUTREACH (OUTPATIENT)
Dept: HEALTH INFORMATION MANAGEMENT | Facility: OTHER | Age: 73
End: 2018-08-24

## 2018-08-24 LAB
BACTERIA UR CULT: ABNORMAL
BACTERIA UR CULT: ABNORMAL
SIGNIFICANT IND 70042: ABNORMAL
SITE SITE: ABNORMAL
SOURCE SOURCE: ABNORMAL

## 2022-12-02 ENCOUNTER — TELEPHONE ENCOUNTER (OUTPATIENT)
Dept: URBAN - METROPOLITAN AREA CLINIC 7 | Facility: CLINIC | Age: 77
End: 2022-12-02

## 2022-12-07 ENCOUNTER — TELEPHONE ENCOUNTER (OUTPATIENT)
Dept: URBAN - METROPOLITAN AREA CLINIC 7 | Facility: CLINIC | Age: 77
End: 2022-12-07

## 2022-12-07 ENCOUNTER — OFFICE VISIT (OUTPATIENT)
Dept: URBAN - METROPOLITAN AREA CLINIC 9 | Facility: CLINIC | Age: 77
End: 2022-12-07